# Patient Record
Sex: FEMALE | Race: WHITE | NOT HISPANIC OR LATINO | Employment: UNEMPLOYED | URBAN - NONMETROPOLITAN AREA
[De-identification: names, ages, dates, MRNs, and addresses within clinical notes are randomized per-mention and may not be internally consistent; named-entity substitution may affect disease eponyms.]

---

## 2017-01-17 ENCOUNTER — LAB REQUISITION (OUTPATIENT)
Dept: LAB | Facility: HOSPITAL | Age: 62
End: 2017-01-17

## 2017-01-17 DIAGNOSIS — O26.92 PREGNANCY RELATED CONDITION IN SECOND TRIMESTER: ICD-10-CM

## 2017-01-17 LAB
GLUCOSE BLD-MCNC: 249 MG/DL (ref 74–98)
HGB BLD-MCNC: 6.2 G/DL (ref 12–16)

## 2017-02-20 ENCOUNTER — OFFICE VISIT (OUTPATIENT)
Dept: INTERNAL MEDICINE | Facility: CLINIC | Age: 62
End: 2017-02-20

## 2017-02-20 VITALS
SYSTOLIC BLOOD PRESSURE: 110 MMHG | DIASTOLIC BLOOD PRESSURE: 70 MMHG | BODY MASS INDEX: 27 KG/M2 | HEIGHT: 66 IN | WEIGHT: 168 LBS | HEART RATE: 60 BPM

## 2017-02-20 DIAGNOSIS — K21.9 GASTROESOPHAGEAL REFLUX DISEASE WITHOUT ESOPHAGITIS: ICD-10-CM

## 2017-02-20 DIAGNOSIS — G47.00 INSOMNIA, UNSPECIFIED TYPE: ICD-10-CM

## 2017-02-20 DIAGNOSIS — E78.5 ELEVATED LIPIDS: ICD-10-CM

## 2017-02-20 DIAGNOSIS — I10 ESSENTIAL HYPERTENSION: Primary | ICD-10-CM

## 2017-02-20 DIAGNOSIS — D36.9 TUBULAR ADENOMA: ICD-10-CM

## 2017-02-20 DIAGNOSIS — D45 POLYCYTHEMIA VERA (HCC): ICD-10-CM

## 2017-02-20 DIAGNOSIS — K57.30 SIGMOID DIVERTICULOSIS: ICD-10-CM

## 2017-02-20 LAB
ALBUMIN SERPL-MCNC: 4 G/DL (ref 3.2–4.8)
ALBUMIN/GLOB SERPL: 1.4 G/DL (ref 1.5–2.5)
ALP SERPL-CCNC: 141 U/L (ref 25–100)
ALT SERPL W P-5'-P-CCNC: 21 U/L (ref 7–40)
ANION GAP SERPL CALCULATED.3IONS-SCNC: 2 MMOL/L (ref 3–11)
ARTICHOKE IGE QN: 99 MG/DL (ref 0–130)
AST SERPL-CCNC: 27 U/L (ref 0–33)
BILIRUB SERPL-MCNC: 0.7 MG/DL (ref 0.3–1.2)
BUN BLD-MCNC: 19 MG/DL (ref 9–23)
BUN/CREAT SERPL: 27.1 (ref 7–25)
CALCIUM SPEC-SCNC: 10.1 MG/DL (ref 8.7–10.4)
CHLORIDE SERPL-SCNC: 101 MMOL/L (ref 99–109)
CHOLEST SERPL-MCNC: 156 MG/DL (ref 0–200)
CO2 SERPL-SCNC: 37 MMOL/L (ref 20–31)
CREAT BLD-MCNC: 0.7 MG/DL (ref 0.6–1.3)
GFR SERPL CREATININE-BSD FRML MDRD: 85 ML/MIN/1.73
GLOBULIN UR ELPH-MCNC: 2.8 GM/DL
GLUCOSE BLD-MCNC: 101 MG/DL (ref 70–100)
HDLC SERPL-MCNC: 40 MG/DL (ref 40–60)
POTASSIUM BLD-SCNC: 5.1 MMOL/L (ref 3.5–5.5)
PROT SERPL-MCNC: 6.8 G/DL (ref 5.7–8.2)
SODIUM BLD-SCNC: 140 MMOL/L (ref 132–146)
TRIGL SERPL-MCNC: 133 MG/DL (ref 0–150)
TSH SERPL DL<=0.05 MIU/L-ACNC: 3.05 MIU/ML (ref 0.35–5.35)

## 2017-02-20 PROCEDURE — 80053 COMPREHEN METABOLIC PANEL: CPT | Performed by: INTERNAL MEDICINE

## 2017-02-20 PROCEDURE — 36415 COLL VENOUS BLD VENIPUNCTURE: CPT | Performed by: INTERNAL MEDICINE

## 2017-02-20 PROCEDURE — 84443 ASSAY THYROID STIM HORMONE: CPT | Performed by: INTERNAL MEDICINE

## 2017-02-20 PROCEDURE — 99214 OFFICE O/P EST MOD 30 MIN: CPT | Performed by: INTERNAL MEDICINE

## 2017-02-20 PROCEDURE — 80061 LIPID PANEL: CPT | Performed by: INTERNAL MEDICINE

## 2017-02-20 NOTE — PROGRESS NOTES
Patient is a 61 y.o. female who is here for a follow up of chronic conditions.  Chief Complaint   Patient presents with   • Hypertension   • Hyperlipidemia         HPI:  Here for f/u.  Doing well on just 1/2 of her BP med.  No dizziness or lightheadedness.  Sleep is not the best.  Energy level is fair.  No HA's.  No nausea or emesis.  No ankle edema.      History:    Patient Active Problem List   Diagnosis   • Malignant neoplasm of breast   • Elevated lipids   • Gastroesophageal reflux disease without esophagitis   • Hypertension   • Insomnia   • Polycythemia vera   • Tubular adenoma   • Sigmoid diverticulosis   • Thrombophlebitis arm       Past Medical History   Diagnosis Date   • Acute bronchitis    • Plantar fasciitis        Past Surgical History   Procedure Laterality Date   • Mastectomy     • Mastoidectomy Bilateral      Right in 12/02 and Left in 4/10   • Total abdominal hysterectomy  1995       Current Outpatient Prescriptions on File Prior to Visit   Medication Sig   • atorvastatin (LIPITOR) 10 MG tablet TAKE 1 TABLET BY MOUTH AT BEDTIME   • hydroxyurea (HYDREA) 500 MG capsule 3 capsules daily.   • valsartan-hydrochlorothiazide (DIOVAN-HCT) 160-25 MG per tablet TAKE 1 TABLET BY MOUTH EVERY MORNING     No current facility-administered medications on file prior to visit.        Family History   Problem Relation Age of Onset   • Heart disease Other    • Diabetes Other    • Hypertension Other    • Cancer Other    • Other Other      CVA (cerebral infarction)       Social History     Social History   • Marital status:      Spouse name: N/A   • Number of children: N/A   • Years of education: N/A     Occupational History   • Not on file.     Social History Main Topics   • Smoking status: Never Smoker   • Smokeless tobacco: Never Used   • Alcohol use Not on file   • Drug use: No   • Sexual activity: Not on file     Other Topics Concern   • Not on file     Social History Narrative         ROS:    Review of  "Systems   Constitutional: Negative for chills, diaphoresis, fatigue, fever and unexpected weight change.   HENT: Negative for congestion, ear pain, hearing loss, nosebleeds, postnasal drip, sinus pressure and sore throat.    Eyes: Negative for pain, discharge and itching.   Respiratory: Positive for cough. Negative for chest tightness, shortness of breath and wheezing.    Cardiovascular: Negative for chest pain, palpitations and leg swelling.   Gastrointestinal: Negative for abdominal distention, abdominal pain, blood in stool, constipation, diarrhea, nausea and vomiting.        6/16 colonoscopy with polypectomy times 6   Endocrine: Negative for polydipsia and polyuria.   Genitourinary: Negative for difficulty urinating, dysuria, frequency and hematuria.   Musculoskeletal: Negative for arthralgias, back pain, gait problem, joint swelling and myalgias.   Skin: Negative for rash and wound.   Neurological: Negative for dizziness, syncope, weakness and headaches.   Psychiatric/Behavioral: Positive for sleep disturbance. Negative for dysphoric mood. The patient is not nervous/anxious.        Visit Vitals   • /70   • Pulse 60   • Ht 66\" (167.6 cm)   • Wt 168 lb (76.2 kg)   • BMI 27.12 kg/m2       Physical Exam:    Physical Exam   Constitutional: She is oriented to person, place, and time. She appears well-developed and well-nourished.   HENT:   Head: Normocephalic and atraumatic.   Right Ear: External ear normal.   Left Ear: External ear normal.   Mouth/Throat: Oropharynx is clear and moist.   Eyes: Conjunctivae and EOM are normal.   Neck: Normal range of motion. Neck supple.   Cardiovascular: Normal rate, regular rhythm and normal heart sounds.    Pulmonary/Chest: Effort normal and breath sounds normal.   Abdominal: Soft. Bowel sounds are normal.   Musculoskeletal: Normal range of motion.   Lymphadenopathy:     She has no cervical adenopathy.   Neurological: She is alert and oriented to person, place, and time. "   Skin: Skin is warm and dry. No erythema.   Psychiatric: She has a normal mood and affect. Her behavior is normal. Thought content normal.       Procedure:      Discussion/Summary:  htn-stable on 1/2 dose  PV-per Dr Hook  hyperlipidemia-labs today  breast ca-per Dr Hook  TA-repeat colonoscopy summer 2019  insomnia-advised to take the trazodone prn  High risk meds-labs today      labs noted and dw patient, counseled on low carb       Current Outpatient Prescriptions:   •  atorvastatin (LIPITOR) 10 MG tablet, TAKE 1 TABLET BY MOUTH AT BEDTIME, Disp: 90 tablet, Rfl: 0  •  hydroxyurea (HYDREA) 500 MG capsule, 3 capsules daily., Disp: , Rfl: 3  •  valsartan-hydrochlorothiazide (DIOVAN-HCT) 160-25 MG per tablet, TAKE 1 TABLET BY MOUTH EVERY MORNING, Disp: 90 tablet, Rfl: 0        Kaylan was seen today for hypertension and hyperlipidemia.    Diagnoses and all orders for this visit:    Essential hypertension    Gastroesophageal reflux disease without esophagitis    Sigmoid diverticulosis    Polycythemia vera    Elevated lipids  -     Comprehensive Metabolic Panel  -     Lipid Panel  -     TSH    Insomnia, unspecified type    Tubular adenoma

## 2017-02-27 RX ORDER — ATORVASTATIN CALCIUM 10 MG/1
TABLET, FILM COATED ORAL
Qty: 90 TABLET | Refills: 0 | Status: SHIPPED | OUTPATIENT
Start: 2017-02-27 | End: 2017-05-31 | Stop reason: SDUPTHER

## 2017-02-27 RX ORDER — VALSARTAN AND HYDROCHLOROTHIAZIDE 160; 25 MG/1; MG/1
TABLET ORAL
Qty: 90 TABLET | Refills: 0 | Status: SHIPPED | OUTPATIENT
Start: 2017-02-27 | End: 2017-05-31 | Stop reason: SDUPTHER

## 2017-05-31 RX ORDER — ATORVASTATIN CALCIUM 10 MG
TABLET ORAL
Qty: 90 TABLET | Refills: 0 | Status: SHIPPED | OUTPATIENT
Start: 2017-05-31 | End: 2017-09-02 | Stop reason: SDUPTHER

## 2017-05-31 RX ORDER — VALSARTAN AND HYDROCHLOROTHIAZIDE 160; 25 MG/1; MG/1
TABLET ORAL
Qty: 90 TABLET | Refills: 0 | Status: SHIPPED | OUTPATIENT
Start: 2017-05-31 | End: 2017-09-02 | Stop reason: SDUPTHER

## 2017-08-24 ENCOUNTER — OFFICE VISIT (OUTPATIENT)
Dept: INTERNAL MEDICINE | Facility: CLINIC | Age: 62
End: 2017-08-24

## 2017-08-24 VITALS
WEIGHT: 166 LBS | SYSTOLIC BLOOD PRESSURE: 120 MMHG | HEIGHT: 66 IN | DIASTOLIC BLOOD PRESSURE: 70 MMHG | HEART RATE: 64 BPM | BODY MASS INDEX: 26.68 KG/M2

## 2017-08-24 DIAGNOSIS — K21.9 GASTROESOPHAGEAL REFLUX DISEASE WITHOUT ESOPHAGITIS: ICD-10-CM

## 2017-08-24 DIAGNOSIS — J30.2 SEASONAL ALLERGIC RHINITIS, UNSPECIFIED ALLERGIC RHINITIS TRIGGER: ICD-10-CM

## 2017-08-24 DIAGNOSIS — I10 ESSENTIAL HYPERTENSION: Primary | ICD-10-CM

## 2017-08-24 DIAGNOSIS — D45 POLYCYTHEMIA VERA (HCC): ICD-10-CM

## 2017-08-24 DIAGNOSIS — K57.30 SIGMOID DIVERTICULOSIS: ICD-10-CM

## 2017-08-24 DIAGNOSIS — G47.00 INSOMNIA, UNSPECIFIED TYPE: ICD-10-CM

## 2017-08-24 DIAGNOSIS — D36.9 TUBULAR ADENOMA: ICD-10-CM

## 2017-08-24 DIAGNOSIS — E78.5 ELEVATED LIPIDS: ICD-10-CM

## 2017-08-24 LAB
ALBUMIN SERPL-MCNC: 4.2 G/DL (ref 3.2–4.8)
ALBUMIN/GLOB SERPL: 1.5 G/DL (ref 1.5–2.5)
ALP SERPL-CCNC: 137 U/L (ref 25–100)
ALT SERPL W P-5'-P-CCNC: 20 U/L (ref 7–40)
ANION GAP SERPL CALCULATED.3IONS-SCNC: 8 MMOL/L (ref 3–11)
ARTICHOKE IGE QN: 90 MG/DL (ref 0–130)
AST SERPL-CCNC: 23 U/L (ref 0–33)
BILIRUB SERPL-MCNC: 0.6 MG/DL (ref 0.3–1.2)
BUN BLD-MCNC: 17 MG/DL (ref 9–23)
BUN/CREAT SERPL: 21.3 (ref 7–25)
CALCIUM SPEC-SCNC: 10.1 MG/DL (ref 8.7–10.4)
CHLORIDE SERPL-SCNC: 101 MMOL/L (ref 99–109)
CHOLEST SERPL-MCNC: 150 MG/DL (ref 0–200)
CO2 SERPL-SCNC: 30 MMOL/L (ref 20–31)
CREAT BLD-MCNC: 0.8 MG/DL (ref 0.6–1.3)
DEPRECATED RDW RBC AUTO: 83.5 FL (ref 37–54)
ERYTHROCYTE [DISTWIDTH] IN BLOOD BY AUTOMATED COUNT: 21 % (ref 11.3–14.5)
GFR SERPL CREATININE-BSD FRML MDRD: 72 ML/MIN/1.73
GLOBULIN UR ELPH-MCNC: 2.8 GM/DL
GLUCOSE BLD-MCNC: 95 MG/DL (ref 70–100)
HCT VFR BLD AUTO: 41.1 % (ref 34.5–44)
HDLC SERPL-MCNC: 35 MG/DL (ref 40–60)
HGB BLD-MCNC: 12.7 G/DL (ref 11.5–15.5)
MCH RBC QN AUTO: 34.5 PG (ref 27–31)
MCHC RBC AUTO-ENTMCNC: 30.9 G/DL (ref 32–36)
MCV RBC AUTO: 111.7 FL (ref 80–99)
PLATELET # BLD AUTO: 831 10*3/MM3 (ref 150–450)
PMV BLD AUTO: 12 FL (ref 6–12)
POTASSIUM BLD-SCNC: 4.6 MMOL/L (ref 3.5–5.5)
PROT SERPL-MCNC: 7 G/DL (ref 5.7–8.2)
RBC # BLD AUTO: 3.68 10*6/MM3 (ref 3.89–5.14)
SODIUM BLD-SCNC: 139 MMOL/L (ref 132–146)
TRIGL SERPL-MCNC: 172 MG/DL (ref 0–150)
WBC NRBC COR # BLD: 7.65 10*3/MM3 (ref 3.5–10.8)

## 2017-08-24 PROCEDURE — 80053 COMPREHEN METABOLIC PANEL: CPT | Performed by: INTERNAL MEDICINE

## 2017-08-24 PROCEDURE — 85027 COMPLETE CBC AUTOMATED: CPT | Performed by: INTERNAL MEDICINE

## 2017-08-24 PROCEDURE — 99214 OFFICE O/P EST MOD 30 MIN: CPT | Performed by: INTERNAL MEDICINE

## 2017-08-24 PROCEDURE — 80061 LIPID PANEL: CPT | Performed by: INTERNAL MEDICINE

## 2017-08-24 RX ORDER — LEVOCETIRIZINE DIHYDROCHLORIDE 5 MG/1
5 TABLET, FILM COATED ORAL EVERY EVENING
Qty: 30 TABLET | Refills: 5 | Status: SHIPPED | OUTPATIENT
Start: 2017-08-24

## 2017-08-24 NOTE — PROGRESS NOTES
Patient is a 62 y.o. female who is here for a follow up of chronic condition.  Chief Complaint   Patient presents with   • Hypertension   • Hyperlipidemia         HPI:  Here for f/u.  Some ear pressure.  Occasional allergies.  Still has nonproductive cough.  Continues on 1/2 of diovan.  Occasional dizziness.  No recurrent cyanosis.  Occasional HA's.  Sleep is not the best. No fever or chills or night sweats.   History:    Patient Active Problem List   Diagnosis   • Malignant neoplasm of breast   • Elevated lipids   • Gastroesophageal reflux disease without esophagitis   • Hypertension   • Insomnia   • Polycythemia vera   • Tubular adenoma   • Sigmoid diverticulosis   • Seasonal allergic rhinitis       Past Medical History:   Diagnosis Date   • Acute bronchitis    • Plantar fasciitis        Past Surgical History:   Procedure Laterality Date   • MASTECTOMY     • MASTOIDECTOMY Bilateral     Right in 12/02 and Left in 4/10   • TOTAL ABDOMINAL HYSTERECTOMY  1995       Current Outpatient Prescriptions on File Prior to Visit   Medication Sig   • hydroxyurea (HYDREA) 500 MG capsule 3 capsules daily.   • LIPITOR 10 MG tablet TAKE 1 TABLET BY MOUTH EVERY NIGHT AT BEDTIME   • valsartan-hydrochlorothiazide (DIOVAN-HCT) 160-25 MG per tablet TAKE 1 TABLET BY MOUTH EVERY MORNING     No current facility-administered medications on file prior to visit.        Family History   Problem Relation Age of Onset   • Heart disease Other    • Diabetes Other    • Hypertension Other    • Cancer Other    • Other Other      CVA (cerebral infarction)       Social History     Social History   • Marital status:      Spouse name: N/A   • Number of children: N/A   • Years of education: N/A     Occupational History   • Not on file.     Social History Main Topics   • Smoking status: Never Smoker   • Smokeless tobacco: Never Used   • Alcohol use Not on file   • Drug use: No   • Sexual activity: Not on file     Other Topics Concern   • Not on file  "    Social History Narrative         ROS:    Review of Systems   Constitutional: Negative for chills, diaphoresis, fatigue, fever and unexpected weight change.   HENT: Negative for congestion, ear pain, hearing loss, nosebleeds, postnasal drip, sinus pressure and sore throat.    Eyes: Negative for pain, discharge and itching.   Respiratory: Positive for cough. Negative for chest tightness, shortness of breath and wheezing.    Cardiovascular: Negative for chest pain, palpitations and leg swelling.   Gastrointestinal: Negative for abdominal distention, abdominal pain, blood in stool, constipation, diarrhea, nausea and vomiting.        6/16 colonoscopy with polypectomy times 6   Endocrine: Negative for polydipsia and polyuria.   Genitourinary: Negative for difficulty urinating, dysuria, frequency and hematuria.   Musculoskeletal: Negative for arthralgias, back pain, gait problem, joint swelling and myalgias.   Skin: Negative for rash and wound.   Allergic/Immunologic: Positive for environmental allergies.   Neurological: Negative for dizziness, syncope, weakness and headaches.   Psychiatric/Behavioral: Positive for sleep disturbance. Negative for dysphoric mood. The patient is not nervous/anxious.        /70  Pulse 64  Ht 66\" (167.6 cm)  Wt 166 lb (75.3 kg)  BMI 26.79 kg/m2    Physical Exam:    Physical Exam   Constitutional: She is oriented to person, place, and time. She appears well-developed and well-nourished.   HENT:   Head: Normocephalic and atraumatic.   Right Ear: External ear normal.   Left Ear: External ear normal.   Mouth/Throat: Oropharynx is clear and moist.   Dull tm   Eyes: Conjunctivae and EOM are normal.   Neck: Normal range of motion. Neck supple.   Cardiovascular: Normal rate, regular rhythm and normal heart sounds.    Pulmonary/Chest: Effort normal and breath sounds normal.   Abdominal: Soft. Bowel sounds are normal.   Musculoskeletal: Normal range of motion.   Lymphadenopathy:     She " has no cervical adenopathy.   Neurological: She is alert and oriented to person, place, and time.   Skin: Skin is warm and dry. No erythema.   Psychiatric: She has a normal mood and affect. Her behavior is normal. Thought content normal.       Procedure:      Discussion/Summary:  htn-stable on 1/2 dose  PV-per Dr Hook  hyperlipidemia-labs today  breast ca-per Dr Hook  TA-repeat colonoscopy summer 2019  insomnia-xyzal prn  Allergic rhinitis-xyzal rx  High risk meds-labs today      labs noted and dw patient, counseled on low carb       Current Outpatient Prescriptions:   •  hydroxyurea (HYDREA) 500 MG capsule, 3 capsules daily., Disp: , Rfl: 3  •  LIPITOR 10 MG tablet, TAKE 1 TABLET BY MOUTH EVERY NIGHT AT BEDTIME, Disp: 90 tablet, Rfl: 0  •  valsartan-hydrochlorothiazide (DIOVAN-HCT) 160-25 MG per tablet, TAKE 1 TABLET BY MOUTH EVERY MORNING, Disp: 90 tablet, Rfl: 0  •  levocetirizine (XYZAL) 5 MG tablet, Take 1 tablet by mouth Every Evening., Disp: 30 tablet, Rfl: 5        Kaylan was seen today for hypertension and hyperlipidemia.    Diagnoses and all orders for this visit:    Essential hypertension  -     CBC (No Diff)    Gastroesophageal reflux disease without esophagitis    Sigmoid diverticulosis    Polycythemia vera    Elevated lipids  -     Comprehensive Metabolic Panel  -     Lipid Panel    Insomnia, unspecified type    Tubular adenoma    Seasonal allergic rhinitis, unspecified allergic rhinitis trigger  -     levocetirizine (XYZAL) 5 MG tablet; Take 1 tablet by mouth Every Evening.

## 2017-09-05 RX ORDER — VALSARTAN AND HYDROCHLOROTHIAZIDE 160; 25 MG/1; MG/1
TABLET ORAL
Qty: 90 TABLET | Refills: 0 | Status: SHIPPED | OUTPATIENT
Start: 2017-09-05 | End: 2017-12-08 | Stop reason: SDUPTHER

## 2017-09-05 RX ORDER — ATORVASTATIN CALCIUM 10 MG
TABLET ORAL
Qty: 90 TABLET | Refills: 0 | Status: SHIPPED | OUTPATIENT
Start: 2017-09-05 | End: 2017-12-08 | Stop reason: SDUPTHER

## 2017-12-08 RX ORDER — ATORVASTATIN CALCIUM 10 MG/1
10 TABLET, FILM COATED ORAL NIGHTLY
Qty: 90 TABLET | Refills: 2 | Status: SHIPPED | OUTPATIENT
Start: 2017-12-08 | End: 2021-01-11 | Stop reason: SDUPTHER

## 2017-12-08 RX ORDER — VALSARTAN AND HYDROCHLOROTHIAZIDE 160; 25 MG/1; MG/1
1 TABLET ORAL DAILY
Qty: 90 TABLET | Refills: 2 | Status: SHIPPED | OUTPATIENT
Start: 2017-12-08 | End: 2021-01-11 | Stop reason: SDUPTHER

## 2018-02-23 ENCOUNTER — OFFICE VISIT (OUTPATIENT)
Dept: INTERNAL MEDICINE | Facility: CLINIC | Age: 63
End: 2018-02-23

## 2018-02-23 VITALS
SYSTOLIC BLOOD PRESSURE: 110 MMHG | HEIGHT: 66 IN | WEIGHT: 165 LBS | BODY MASS INDEX: 26.52 KG/M2 | DIASTOLIC BLOOD PRESSURE: 74 MMHG | HEART RATE: 60 BPM

## 2018-02-23 DIAGNOSIS — G47.00 INSOMNIA, UNSPECIFIED TYPE: ICD-10-CM

## 2018-02-23 DIAGNOSIS — E78.5 ELEVATED LIPIDS: ICD-10-CM

## 2018-02-23 DIAGNOSIS — D45 POLYCYTHEMIA VERA (HCC): ICD-10-CM

## 2018-02-23 DIAGNOSIS — K57.30 SIGMOID DIVERTICULOSIS: ICD-10-CM

## 2018-02-23 DIAGNOSIS — D36.9 TUBULAR ADENOMA: ICD-10-CM

## 2018-02-23 DIAGNOSIS — K21.9 GASTROESOPHAGEAL REFLUX DISEASE WITHOUT ESOPHAGITIS: ICD-10-CM

## 2018-02-23 DIAGNOSIS — I10 ESSENTIAL HYPERTENSION: Primary | ICD-10-CM

## 2018-02-23 DIAGNOSIS — J30.2 CHRONIC SEASONAL ALLERGIC RHINITIS DUE TO OTHER ALLERGEN: ICD-10-CM

## 2018-02-23 PROCEDURE — 99214 OFFICE O/P EST MOD 30 MIN: CPT | Performed by: INTERNAL MEDICINE

## 2018-02-23 NOTE — PROGRESS NOTES
Patient is a 62 y.o. female who is here for a follow up of chronic conditions.  Chief Complaint   Patient presents with   • Hypertension   • Hyperlipidemia         HPI:    Here for f/u.  Doing ok.  Energy level is good.  BP is good.  No abdominal pains.  No fever or chills.  Sleeping ok.  No dizziness or lightheadedness.  No edema.   History:    Patient Active Problem List   Diagnosis   • Malignant neoplasm of breast   • Elevated lipids   • Gastroesophageal reflux disease without esophagitis   • Hypertension   • Insomnia   • Polycythemia vera   • Tubular adenoma   • Sigmoid diverticulosis   • Seasonal allergic rhinitis       Past Medical History:   Diagnosis Date   • Acute bronchitis    • Plantar fasciitis        Past Surgical History:   Procedure Laterality Date   • MASTECTOMY     • MASTOIDECTOMY Bilateral     Right in 12/02 and Left in 4/10   • TOTAL ABDOMINAL HYSTERECTOMY  1995       Current Outpatient Prescriptions on File Prior to Visit   Medication Sig   • atorvastatin (LIPITOR) 10 MG tablet Take 1 tablet by mouth Every Night.   • hydroxyurea (HYDREA) 500 MG capsule 3 capsules daily.   • levocetirizine (XYZAL) 5 MG tablet Take 1 tablet by mouth Every Evening.   • valsartan-hydrochlorothiazide (DIOVAN-HCT) 160-25 MG per tablet Take 1 tablet by mouth Daily.     No current facility-administered medications on file prior to visit.        Family History   Problem Relation Age of Onset   • Heart disease Other    • Diabetes Other    • Hypertension Other    • Cancer Other    • Other Other      CVA (cerebral infarction)       Social History     Social History   • Marital status:      Spouse name: N/A   • Number of children: N/A   • Years of education: N/A     Occupational History   • Not on file.     Social History Main Topics   • Smoking status: Never Smoker   • Smokeless tobacco: Never Used   • Alcohol use Not on file   • Drug use: No   • Sexual activity: Not on file     Other Topics Concern   • Not on file  "    Social History Narrative         ROS:    Review of Systems   Constitutional: Negative for chills, diaphoresis, fatigue, fever and unexpected weight change.   HENT: Negative for congestion, ear pain, hearing loss, nosebleeds, postnasal drip, sinus pressure and sore throat.    Eyes: Negative for pain, discharge and itching.   Respiratory: Positive for cough. Negative for chest tightness, shortness of breath and wheezing.    Cardiovascular: Negative for chest pain, palpitations and leg swelling.   Gastrointestinal: Negative for abdominal distention, abdominal pain, blood in stool, constipation, diarrhea, nausea and vomiting.        6/16 colonoscopy with polypectomy times 6   Endocrine: Negative for polydipsia and polyuria.   Genitourinary: Negative for difficulty urinating, dysuria, frequency and hematuria.   Musculoskeletal: Negative for arthralgias, back pain, gait problem, joint swelling and myalgias.   Skin: Negative for rash and wound.   Allergic/Immunologic: Positive for environmental allergies.   Neurological: Negative for dizziness, syncope, weakness and headaches.   Psychiatric/Behavioral: Positive for sleep disturbance. Negative for dysphoric mood. The patient is not nervous/anxious.        /74  Pulse 60  Ht 167.6 cm (65.98\")  Wt 74.8 kg (165 lb)  BMI 26.64 kg/m2    Physical Exam:    Physical Exam   Constitutional: She is oriented to person, place, and time. She appears well-developed and well-nourished.   HENT:   Head: Normocephalic and atraumatic.   Right Ear: External ear normal.   Left Ear: External ear normal.   Mouth/Throat: Oropharynx is clear and moist.   Dull tm   Eyes: Conjunctivae and EOM are normal.   Neck: Normal range of motion. Neck supple.   Cardiovascular: Normal rate, regular rhythm and normal heart sounds.    Pulmonary/Chest: Effort normal and breath sounds normal.   Abdominal: Soft. Bowel sounds are normal.   Musculoskeletal: Normal range of motion.   Lymphadenopathy:     She " has no cervical adenopathy.   Neurological: She is alert and oriented to person, place, and time.   Skin: Skin is warm and dry. No erythema.   Psychiatric: She has a normal mood and affect. Her behavior is normal. Thought content normal.       Procedure:      Discussion/Summary:    htn-stable on 1/2 dose  PV-per Dr Hook  hyperlipidemia-labs soon  breast ca-per Dr Hook  TA-repeat colonoscopy summer 2019  Allergic rhinitis-xyzal rx prn  High risk meds-labs soon      labs noted and dw patient, counseled on low carb     Needs cmp/flp/tsh next week    Current Outpatient Prescriptions:   •  atorvastatin (LIPITOR) 10 MG tablet, Take 1 tablet by mouth Every Night., Disp: 90 tablet, Rfl: 2  •  hydroxyurea (HYDREA) 500 MG capsule, 3 capsules daily., Disp: , Rfl: 3  •  levocetirizine (XYZAL) 5 MG tablet, Take 1 tablet by mouth Every Evening., Disp: 30 tablet, Rfl: 5  •  valsartan-hydrochlorothiazide (DIOVAN-HCT) 160-25 MG per tablet, Take 1 tablet by mouth Daily., Disp: 90 tablet, Rfl: 2        Kaylan was seen today for hypertension and hyperlipidemia.    Diagnoses and all orders for this visit:    Essential hypertension    Chronic seasonal allergic rhinitis due to other allergen    Gastroesophageal reflux disease without esophagitis    Sigmoid diverticulosis    Polycythemia vera    Elevated lipids    Insomnia, unspecified type    Tubular adenoma

## 2018-08-23 ENCOUNTER — OFFICE VISIT (OUTPATIENT)
Dept: INTERNAL MEDICINE | Facility: CLINIC | Age: 63
End: 2018-08-23

## 2018-08-23 VITALS
HEIGHT: 66 IN | BODY MASS INDEX: 26.2 KG/M2 | WEIGHT: 163 LBS | SYSTOLIC BLOOD PRESSURE: 130 MMHG | HEART RATE: 64 BPM | DIASTOLIC BLOOD PRESSURE: 70 MMHG

## 2018-08-23 DIAGNOSIS — K57.30 SIGMOID DIVERTICULOSIS: ICD-10-CM

## 2018-08-23 DIAGNOSIS — E78.5 ELEVATED LIPIDS: ICD-10-CM

## 2018-08-23 DIAGNOSIS — F51.01 PRIMARY INSOMNIA: ICD-10-CM

## 2018-08-23 DIAGNOSIS — K21.9 GASTROESOPHAGEAL REFLUX DISEASE WITHOUT ESOPHAGITIS: ICD-10-CM

## 2018-08-23 DIAGNOSIS — J30.1 CHRONIC SEASONAL ALLERGIC RHINITIS DUE TO POLLEN: ICD-10-CM

## 2018-08-23 DIAGNOSIS — D36.9 TUBULAR ADENOMA: ICD-10-CM

## 2018-08-23 DIAGNOSIS — R73.09 ABNORMAL GLUCOSE: ICD-10-CM

## 2018-08-23 DIAGNOSIS — D45 POLYCYTHEMIA VERA (HCC): ICD-10-CM

## 2018-08-23 DIAGNOSIS — I10 ESSENTIAL HYPERTENSION: Primary | ICD-10-CM

## 2018-08-23 LAB
ALBUMIN SERPL-MCNC: 4.26 G/DL (ref 3.2–4.8)
ALBUMIN/GLOB SERPL: 1.5 G/DL (ref 1.5–2.5)
ALP SERPL-CCNC: 106 U/L (ref 25–100)
ALT SERPL W P-5'-P-CCNC: 15 U/L (ref 7–40)
ANION GAP SERPL CALCULATED.3IONS-SCNC: 11 MMOL/L (ref 3–11)
ARTICHOKE IGE QN: 97 MG/DL (ref 0–130)
AST SERPL-CCNC: 24 U/L (ref 0–33)
BILIRUB SERPL-MCNC: 0.7 MG/DL (ref 0.3–1.2)
BUN BLD-MCNC: 13 MG/DL (ref 9–23)
BUN/CREAT SERPL: 15.5 (ref 7–25)
CALCIUM SPEC-SCNC: 9.7 MG/DL (ref 8.7–10.4)
CHLORIDE SERPL-SCNC: 100 MMOL/L (ref 99–109)
CHOLEST SERPL-MCNC: 150 MG/DL (ref 0–200)
CO2 SERPL-SCNC: 30 MMOL/L (ref 20–31)
CREAT BLD-MCNC: 0.84 MG/DL (ref 0.6–1.3)
DEPRECATED RDW RBC AUTO: 72.1 FL (ref 37–54)
ERYTHROCYTE [DISTWIDTH] IN BLOOD BY AUTOMATED COUNT: 16.2 % (ref 11.3–14.5)
GFR SERPL CREATININE-BSD FRML MDRD: 68 ML/MIN/1.73
GLOBULIN UR ELPH-MCNC: 2.8 GM/DL
GLUCOSE BLD-MCNC: 101 MG/DL (ref 70–100)
HBA1C MFR BLD: 5.5 % (ref 4.8–5.6)
HCT VFR BLD AUTO: 42.7 % (ref 34.5–44)
HDLC SERPL-MCNC: 41 MG/DL (ref 40–60)
HGB BLD-MCNC: 13.4 G/DL (ref 11.5–15.5)
MCH RBC QN AUTO: 38 PG (ref 27–31)
MCHC RBC AUTO-ENTMCNC: 31.4 G/DL (ref 32–36)
MCV RBC AUTO: 121 FL (ref 80–99)
PLATELET # BLD AUTO: 865 10*3/MM3 (ref 150–450)
PMV BLD AUTO: 11.8 FL (ref 6–12)
POTASSIUM BLD-SCNC: 4.4 MMOL/L (ref 3.5–5.5)
PROT SERPL-MCNC: 7.1 G/DL (ref 5.7–8.2)
RBC # BLD AUTO: 3.53 10*6/MM3 (ref 3.89–5.14)
SODIUM BLD-SCNC: 141 MMOL/L (ref 132–146)
TRIGL SERPL-MCNC: 111 MG/DL (ref 0–150)
TSH SERPL DL<=0.05 MIU/L-ACNC: 2.31 MIU/ML (ref 0.35–5.35)
WBC NRBC COR # BLD: 5.73 10*3/MM3 (ref 3.5–10.8)

## 2018-08-23 PROCEDURE — 80061 LIPID PANEL: CPT | Performed by: INTERNAL MEDICINE

## 2018-08-23 PROCEDURE — 80053 COMPREHEN METABOLIC PANEL: CPT | Performed by: INTERNAL MEDICINE

## 2018-08-23 PROCEDURE — 85027 COMPLETE CBC AUTOMATED: CPT | Performed by: INTERNAL MEDICINE

## 2018-08-23 PROCEDURE — 83036 HEMOGLOBIN GLYCOSYLATED A1C: CPT | Performed by: INTERNAL MEDICINE

## 2018-08-23 PROCEDURE — 84443 ASSAY THYROID STIM HORMONE: CPT | Performed by: INTERNAL MEDICINE

## 2018-08-23 PROCEDURE — 99214 OFFICE O/P EST MOD 30 MIN: CPT | Performed by: INTERNAL MEDICINE

## 2018-08-23 NOTE — PROGRESS NOTES
Patient is a 63 y.o. female who is here for a follow up of chronic conditions.  Chief Complaint   Patient presents with   • Hypertension   • Hyperlipidemia         HPI:    Here for f/u.  Doing ok.  No abdominal pains.  No dizziness or lightheadedness.  Still has occasional cough.  No abdominal pains.  No edema. Sleeping well.   No cyanosis.   History:    Patient Active Problem List   Diagnosis   • Malignant neoplasm of breast (CMS/HCC)   • Elevated lipids   • Gastroesophageal reflux disease without esophagitis   • Hypertension   • Insomnia   • Polycythemia vera (CMS/HCC)   • Tubular adenoma   • Sigmoid diverticulosis   • Seasonal allergic rhinitis       Past Medical History:   Diagnosis Date   • Acute bronchitis    • Plantar fasciitis        Past Surgical History:   Procedure Laterality Date   • MASTECTOMY     • MASTOIDECTOMY Bilateral     Right in 12/02 and Left in 4/10   • TOTAL ABDOMINAL HYSTERECTOMY  1995       Current Outpatient Prescriptions on File Prior to Visit   Medication Sig   • atorvastatin (LIPITOR) 10 MG tablet Take 1 tablet by mouth Every Night.   • hydroxyurea (HYDREA) 500 MG capsule 3 capsules daily.   • levocetirizine (XYZAL) 5 MG tablet Take 1 tablet by mouth Every Evening.   • valsartan-hydrochlorothiazide (DIOVAN-HCT) 160-25 MG per tablet Take 1 tablet by mouth Daily.     No current facility-administered medications on file prior to visit.        Family History   Problem Relation Age of Onset   • Heart disease Other    • Diabetes Other    • Hypertension Other    • Cancer Other    • Other Other         CVA (cerebral infarction)       Social History     Social History   • Marital status:      Spouse name: N/A   • Number of children: N/A   • Years of education: N/A     Occupational History   • Not on file.     Social History Main Topics   • Smoking status: Never Smoker   • Smokeless tobacco: Never Used   • Alcohol use Not on file   • Drug use: No   • Sexual activity: Not on file     Other  "Topics Concern   • Not on file     Social History Narrative   • No narrative on file         Review of Systems   Constitutional: Negative for chills, diaphoresis, fatigue, fever and unexpected weight change.   HENT: Negative for congestion, ear pain, hearing loss, nosebleeds, postnasal drip, sinus pressure and sore throat.    Eyes: Negative for pain, discharge and itching.   Respiratory: Positive for cough. Negative for chest tightness, shortness of breath and wheezing.    Cardiovascular: Negative for chest pain, palpitations and leg swelling.   Gastrointestinal: Negative for abdominal distention, abdominal pain, blood in stool, constipation, diarrhea, nausea and vomiting.        6/16 colonoscopy with polypectomy times 6   Endocrine: Negative for polydipsia and polyuria.   Genitourinary: Negative for difficulty urinating, dysuria, frequency and hematuria.   Musculoskeletal: Negative for arthralgias, back pain, gait problem, joint swelling and myalgias.   Skin: Negative for rash and wound.   Allergic/Immunologic: Positive for environmental allergies.   Neurological: Negative for dizziness, syncope, weakness and headaches.   Psychiatric/Behavioral: Negative for dysphoric mood. The patient is not nervous/anxious.        /70   Pulse 64   Ht 167.6 cm (65.98\")   Wt 73.9 kg (163 lb)   BMI 26.32 kg/m²       Physical Exam   Constitutional: She is oriented to person, place, and time. She appears well-developed and well-nourished.   HENT:   Head: Normocephalic and atraumatic.   Right Ear: External ear normal.   Left Ear: External ear normal.   Mouth/Throat: Oropharynx is clear and moist.   Dull tm   Eyes: Conjunctivae and EOM are normal.   Neck: Normal range of motion. Neck supple.   Cardiovascular: Normal rate, regular rhythm and normal heart sounds.    Pulmonary/Chest: Effort normal and breath sounds normal.   Abdominal: Soft. Bowel sounds are normal.   Musculoskeletal: Normal range of motion.   Lymphadenopathy:    "  She has no cervical adenopathy.   Neurological: She is alert and oriented to person, place, and time.   Skin: Skin is warm and dry. No erythema.   Psychiatric: She has a normal mood and affect. Her behavior is normal. Thought content normal.       Procedure:      Discussion/Summary:    htn-stable on 1/2 dose  PV-per Dr Hook  hyperlipidemia-labs today  breast ca-per Dr Hook  TA-repeat colonoscopy summer 2019  Allergic rhinitis-xyzal rx prn  High risk meds-labs soon      labs noted and dw patient, counseled on low carb/ncs          Current Outpatient Prescriptions:   •  atorvastatin (LIPITOR) 10 MG tablet, Take 1 tablet by mouth Every Night., Disp: 90 tablet, Rfl: 2  •  hydroxyurea (HYDREA) 500 MG capsule, 3 capsules daily., Disp: , Rfl: 3  •  levocetirizine (XYZAL) 5 MG tablet, Take 1 tablet by mouth Every Evening., Disp: 30 tablet, Rfl: 5  •  valsartan-hydrochlorothiazide (DIOVAN-HCT) 160-25 MG per tablet, Take 1 tablet by mouth Daily., Disp: 90 tablet, Rfl: 2        Kaylan was seen today for hypertension and hyperlipidemia.    Diagnoses and all orders for this visit:    Essential hypertension  -     CBC (No Diff)    Chronic seasonal allergic rhinitis due to pollen    Gastroesophageal reflux disease without esophagitis    Sigmoid diverticulosis    Polycythemia vera (CMS/HCC)    Elevated lipids  -     Comprehensive Metabolic Panel  -     TSH  -     Lipid Panel    Primary insomnia    Tubular adenoma    Abnormal glucose  -     Hemoglobin A1c

## 2019-02-27 ENCOUNTER — OFFICE VISIT (OUTPATIENT)
Dept: INTERNAL MEDICINE | Facility: CLINIC | Age: 64
End: 2019-02-27

## 2019-02-27 VITALS
HEART RATE: 64 BPM | SYSTOLIC BLOOD PRESSURE: 120 MMHG | DIASTOLIC BLOOD PRESSURE: 70 MMHG | WEIGHT: 166 LBS | BODY MASS INDEX: 26.68 KG/M2 | HEIGHT: 66 IN

## 2019-02-27 DIAGNOSIS — K57.30 SIGMOID DIVERTICULOSIS: ICD-10-CM

## 2019-02-27 DIAGNOSIS — J30.1 SEASONAL ALLERGIC RHINITIS DUE TO POLLEN: ICD-10-CM

## 2019-02-27 DIAGNOSIS — E78.5 ELEVATED LIPIDS: ICD-10-CM

## 2019-02-27 DIAGNOSIS — F51.01 PRIMARY INSOMNIA: ICD-10-CM

## 2019-02-27 DIAGNOSIS — I10 ESSENTIAL HYPERTENSION: Primary | ICD-10-CM

## 2019-02-27 DIAGNOSIS — K21.9 GASTROESOPHAGEAL REFLUX DISEASE WITHOUT ESOPHAGITIS: ICD-10-CM

## 2019-02-27 DIAGNOSIS — D45 POLYCYTHEMIA VERA (HCC): ICD-10-CM

## 2019-02-27 LAB
ALBUMIN SERPL-MCNC: 4.37 G/DL (ref 3.2–4.8)
ALBUMIN/GLOB SERPL: 1.9 G/DL (ref 1.5–2.5)
ALP SERPL-CCNC: 122 U/L (ref 25–100)
ALT SERPL W P-5'-P-CCNC: 15 U/L (ref 7–40)
ANION GAP SERPL CALCULATED.3IONS-SCNC: 8 MMOL/L (ref 3–11)
ARTICHOKE IGE QN: 103 MG/DL (ref 0–130)
AST SERPL-CCNC: 20 U/L (ref 0–33)
BILIRUB SERPL-MCNC: 0.8 MG/DL (ref 0.3–1.2)
BUN BLD-MCNC: 21 MG/DL (ref 9–23)
BUN/CREAT SERPL: 23.6 (ref 7–25)
CALCIUM SPEC-SCNC: 9.6 MG/DL (ref 8.7–10.4)
CHLORIDE SERPL-SCNC: 100 MMOL/L (ref 99–109)
CHOLEST SERPL-MCNC: 170 MG/DL (ref 0–200)
CO2 SERPL-SCNC: 32 MMOL/L (ref 20–31)
CREAT BLD-MCNC: 0.89 MG/DL (ref 0.6–1.3)
GFR SERPL CREATININE-BSD FRML MDRD: 64 ML/MIN/1.73
GLOBULIN UR ELPH-MCNC: 2.3 GM/DL
GLUCOSE BLD-MCNC: 102 MG/DL (ref 70–100)
HDLC SERPL-MCNC: 36 MG/DL (ref 40–60)
POTASSIUM BLD-SCNC: 4.9 MMOL/L (ref 3.5–5.5)
PROT SERPL-MCNC: 6.7 G/DL (ref 5.7–8.2)
SODIUM BLD-SCNC: 140 MMOL/L (ref 132–146)
TRIGL SERPL-MCNC: 154 MG/DL (ref 0–150)
TSH SERPL DL<=0.05 MIU/L-ACNC: 2.65 MIU/ML (ref 0.35–5.35)

## 2019-02-27 PROCEDURE — 84443 ASSAY THYROID STIM HORMONE: CPT | Performed by: INTERNAL MEDICINE

## 2019-02-27 PROCEDURE — 80053 COMPREHEN METABOLIC PANEL: CPT | Performed by: INTERNAL MEDICINE

## 2019-02-27 PROCEDURE — 80061 LIPID PANEL: CPT | Performed by: INTERNAL MEDICINE

## 2019-02-27 PROCEDURE — 99214 OFFICE O/P EST MOD 30 MIN: CPT | Performed by: INTERNAL MEDICINE

## 2019-02-27 NOTE — PROGRESS NOTES
Patient is a 63 y.o. female who is here for a follow up of chronic conditions.  Chief Complaint   Patient presents with   • Hyperlipidemia   • Hypertension         HPI:    Here for f/u.  BP has been good.  No dizziness or lightheadedness.  No abdominal pains.  Energy level is good.  No falls. No edema.  No fever or chills or night sweats.      History:    Patient Active Problem List   Diagnosis   • Malignant neoplasm of breast (CMS/HCC)   • Elevated lipids   • Gastroesophageal reflux disease without esophagitis   • Hypertension   • Insomnia   • Polycythemia vera (CMS/HCC)   • Tubular adenoma   • Sigmoid diverticulosis   • Seasonal allergic rhinitis       Past Medical History:   Diagnosis Date   • Acute bronchitis    • Plantar fasciitis        Past Surgical History:   Procedure Laterality Date   • MASTECTOMY     • MASTOIDECTOMY Bilateral     Right in 12/02 and Left in 4/10   • TOTAL ABDOMINAL HYSTERECTOMY  1995       Current Outpatient Medications on File Prior to Visit   Medication Sig   • atorvastatin (LIPITOR) 10 MG tablet Take 1 tablet by mouth Every Night.   • hydroxyurea (HYDREA) 500 MG capsule 3 capsules daily.   • levocetirizine (XYZAL) 5 MG tablet Take 1 tablet by mouth Every Evening.   • valsartan-hydrochlorothiazide (DIOVAN-HCT) 160-25 MG per tablet Take 1 tablet by mouth Daily.     No current facility-administered medications on file prior to visit.        Family History   Problem Relation Age of Onset   • Heart disease Other    • Diabetes Other    • Hypertension Other    • Cancer Other    • Other Other         CVA (cerebral infarction)       Social History     Socioeconomic History   • Marital status:      Spouse name: Not on file   • Number of children: Not on file   • Years of education: Not on file   • Highest education level: Not on file   Social Needs   • Financial resource strain: Not on file   • Food insecurity - worry: Not on file   • Food insecurity - inability: Not on file   •  "Transportation needs - medical: Not on file   • Transportation needs - non-medical: Not on file   Occupational History   • Not on file   Tobacco Use   • Smoking status: Never Smoker   • Smokeless tobacco: Never Used   Substance and Sexual Activity   • Alcohol use: Not on file   • Drug use: No   • Sexual activity: Not on file   Other Topics Concern   • Not on file   Social History Narrative   • Not on file         Review of Systems   Constitutional: Negative for chills, diaphoresis, fatigue, fever and unexpected weight change.   HENT: Negative for congestion, ear pain, hearing loss, nosebleeds, postnasal drip, sinus pressure and sore throat.    Eyes: Negative for pain, discharge and itching.   Respiratory: Positive for cough. Negative for chest tightness, shortness of breath and wheezing.    Cardiovascular: Negative for chest pain, palpitations and leg swelling.   Gastrointestinal: Negative for abdominal distention, abdominal pain, blood in stool, constipation, diarrhea, nausea and vomiting.        6/16 colonoscopy with polypectomy times 6   Endocrine: Negative for polydipsia and polyuria.   Genitourinary: Negative for difficulty urinating, dysuria, frequency and hematuria.   Musculoskeletal: Negative for arthralgias, back pain, gait problem, joint swelling and myalgias.   Skin: Negative for rash and wound.   Allergic/Immunologic: Positive for environmental allergies.   Neurological: Negative for dizziness, syncope, weakness and headaches.   Psychiatric/Behavioral: Negative for dysphoric mood. The patient is not nervous/anxious.        /70   Pulse 64   Ht 167.6 cm (65.98\")   Wt 75.3 kg (166 lb)   BMI 26.81 kg/m²       Physical Exam   Constitutional: She is oriented to person, place, and time. She appears well-developed and well-nourished.   HENT:   Head: Normocephalic and atraumatic.   Right Ear: External ear normal.   Left Ear: External ear normal.   Mouth/Throat: Oropharynx is clear and moist.   Dull tm "   Eyes: Conjunctivae and EOM are normal.   Neck: Normal range of motion. Neck supple.   Cardiovascular: Normal rate, regular rhythm and normal heart sounds.   Pulmonary/Chest: Effort normal and breath sounds normal.   Abdominal: Soft. Bowel sounds are normal.   Musculoskeletal: Normal range of motion.   Lymphadenopathy:     She has no cervical adenopathy.   Neurological: She is alert and oriented to person, place, and time.   Skin: Skin is warm and dry. No erythema.   Psychiatric: She has a normal mood and affect. Her behavior is normal. Thought content normal.       Procedure:      Discussion/Summary:    htn-stable on 1/2 dose  PV-per Dr Hook  hyperlipidemia-labs today  breast ca-per Dr Hook  TA-repeat colonoscopy summer 2019  Allergic rhinitis-xyzal rx prn  High risk meds-labs today      labs noted and dw patient, counseled on low carb/ncs        Current Outpatient Medications:   •  atorvastatin (LIPITOR) 10 MG tablet, Take 1 tablet by mouth Every Night., Disp: 90 tablet, Rfl: 2  •  hydroxyurea (HYDREA) 500 MG capsule, 3 capsules daily., Disp: , Rfl: 3  •  levocetirizine (XYZAL) 5 MG tablet, Take 1 tablet by mouth Every Evening., Disp: 30 tablet, Rfl: 5  •  valsartan-hydrochlorothiazide (DIOVAN-HCT) 160-25 MG per tablet, Take 1 tablet by mouth Daily., Disp: 90 tablet, Rfl: 2        Kaylan was seen today for hyperlipidemia and hypertension.    Diagnoses and all orders for this visit:    Essential hypertension    Seasonal allergic rhinitis due to pollen    Sigmoid diverticulosis    Gastroesophageal reflux disease without esophagitis    Polycythemia vera (CMS/HCC)    Primary insomnia    Elevated lipids  -     Comprehensive Metabolic Panel  -     Lipid Panel  -     TSH

## 2019-08-27 ENCOUNTER — OFFICE VISIT (OUTPATIENT)
Dept: INTERNAL MEDICINE | Facility: CLINIC | Age: 64
End: 2019-08-27

## 2019-08-27 VITALS
BODY MASS INDEX: 26.03 KG/M2 | WEIGHT: 162 LBS | SYSTOLIC BLOOD PRESSURE: 120 MMHG | HEIGHT: 66 IN | HEART RATE: 68 BPM | DIASTOLIC BLOOD PRESSURE: 68 MMHG

## 2019-08-27 DIAGNOSIS — J30.1 SEASONAL ALLERGIC RHINITIS DUE TO POLLEN: ICD-10-CM

## 2019-08-27 DIAGNOSIS — E78.5 ELEVATED LIPIDS: ICD-10-CM

## 2019-08-27 DIAGNOSIS — I10 ESSENTIAL HYPERTENSION: Primary | ICD-10-CM

## 2019-08-27 DIAGNOSIS — K57.30 SIGMOID DIVERTICULOSIS: ICD-10-CM

## 2019-08-27 DIAGNOSIS — D36.9 TUBULAR ADENOMA: ICD-10-CM

## 2019-08-27 DIAGNOSIS — K21.9 GASTROESOPHAGEAL REFLUX DISEASE WITHOUT ESOPHAGITIS: ICD-10-CM

## 2019-08-27 DIAGNOSIS — F51.01 PRIMARY INSOMNIA: ICD-10-CM

## 2019-08-27 DIAGNOSIS — D45 POLYCYTHEMIA VERA (HCC): ICD-10-CM

## 2019-08-27 DIAGNOSIS — E55.9 VITAMIN D DEFICIENCY: ICD-10-CM

## 2019-08-27 LAB
25(OH)D3 SERPL-MCNC: 12.1 NG/ML (ref 30–100)
ALBUMIN SERPL-MCNC: 4.4 G/DL (ref 3.5–5.2)
ALBUMIN/GLOB SERPL: 1.4 G/DL
ALP SERPL-CCNC: 115 U/L (ref 39–117)
ALT SERPL W P-5'-P-CCNC: 11 U/L (ref 1–33)
ANION GAP SERPL CALCULATED.3IONS-SCNC: 11.6 MMOL/L (ref 5–15)
AST SERPL-CCNC: 22 U/L (ref 1–32)
BILIRUB SERPL-MCNC: 0.6 MG/DL (ref 0.2–1.2)
BUN BLD-MCNC: 14 MG/DL (ref 8–23)
BUN/CREAT SERPL: 17.7 (ref 7–25)
CALCIUM SPEC-SCNC: 9.6 MG/DL (ref 8.6–10.5)
CHLORIDE SERPL-SCNC: 99 MMOL/L (ref 98–107)
CHOLEST SERPL-MCNC: 146 MG/DL (ref 0–200)
CO2 SERPL-SCNC: 31.4 MMOL/L (ref 22–29)
CREAT BLD-MCNC: 0.79 MG/DL (ref 0.57–1)
DEPRECATED RDW RBC AUTO: 84.4 FL (ref 37–54)
ERYTHROCYTE [DISTWIDTH] IN BLOOD BY AUTOMATED COUNT: 19.9 % (ref 12.3–15.4)
GFR SERPL CREATININE-BSD FRML MDRD: 73 ML/MIN/1.73
GLOBULIN UR ELPH-MCNC: 3.1 GM/DL
GLUCOSE BLD-MCNC: 96 MG/DL (ref 65–99)
HCT VFR BLD AUTO: 40.4 % (ref 34–46.6)
HDLC SERPL-MCNC: 35 MG/DL (ref 40–60)
HGB BLD-MCNC: 12.3 G/DL (ref 12–15.9)
LDLC SERPL CALC-MCNC: 70 MG/DL (ref 0–100)
LDLC/HDLC SERPL: 1.99 {RATIO}
MCH RBC QN AUTO: 35.8 PG (ref 26.6–33)
MCHC RBC AUTO-ENTMCNC: 30.4 G/DL (ref 31.5–35.7)
MCV RBC AUTO: 117.4 FL (ref 79–97)
PLATELET # BLD AUTO: 700 10*3/MM3 (ref 140–450)
PMV BLD AUTO: 12 FL (ref 6–12)
POTASSIUM BLD-SCNC: 3.9 MMOL/L (ref 3.5–5.2)
PROT SERPL-MCNC: 7.5 G/DL (ref 6–8.5)
RBC # BLD AUTO: 3.44 10*6/MM3 (ref 3.77–5.28)
SODIUM BLD-SCNC: 142 MMOL/L (ref 136–145)
TRIGL SERPL-MCNC: 206 MG/DL (ref 0–150)
TSH SERPL DL<=0.05 MIU/L-ACNC: 3.59 MIU/ML (ref 0.27–4.2)
VIT B12 BLD-MCNC: 398 PG/ML (ref 211–946)
VLDLC SERPL-MCNC: 41.2 MG/DL (ref 5–40)
WBC NRBC COR # BLD: 7.32 10*3/MM3 (ref 3.4–10.8)

## 2019-08-27 PROCEDURE — 82607 VITAMIN B-12: CPT | Performed by: INTERNAL MEDICINE

## 2019-08-27 PROCEDURE — 82306 VITAMIN D 25 HYDROXY: CPT | Performed by: INTERNAL MEDICINE

## 2019-08-27 PROCEDURE — 99214 OFFICE O/P EST MOD 30 MIN: CPT | Performed by: INTERNAL MEDICINE

## 2019-08-27 PROCEDURE — 80061 LIPID PANEL: CPT | Performed by: INTERNAL MEDICINE

## 2019-08-27 PROCEDURE — 84443 ASSAY THYROID STIM HORMONE: CPT | Performed by: INTERNAL MEDICINE

## 2019-08-27 PROCEDURE — 80053 COMPREHEN METABOLIC PANEL: CPT | Performed by: INTERNAL MEDICINE

## 2019-08-27 PROCEDURE — 85027 COMPLETE CBC AUTOMATED: CPT | Performed by: INTERNAL MEDICINE

## 2019-08-27 NOTE — PROGRESS NOTES
Patient is a 64 y.o. female who is here for a follow up of chronic conditions.  Chief Complaint   Patient presents with   • Hypertension         HPI:    Here for f/u.  Doing ok.  BP has been good. Rare HAs.  No dizziness or lightheadedness.  No fever or chills or night sweats. No abdominal pains.  No edema.  No SOB.  No diarrhea or constipation.     History:     Patient Active Problem List   Diagnosis   • Malignant neoplasm of breast (CMS/HCC)   • Elevated lipids   • Gastroesophageal reflux disease without esophagitis   • Hypertension   • Insomnia   • Polycythemia vera (CMS/HCC)   • Tubular adenoma   • Sigmoid diverticulosis   • Seasonal allergic rhinitis       Past Medical History:   Diagnosis Date   • Acute bronchitis    • Plantar fasciitis        Past Surgical History:   Procedure Laterality Date   • MASTECTOMY     • MASTOIDECTOMY Bilateral     Right in 12/02 and Left in 4/10   • TOTAL ABDOMINAL HYSTERECTOMY  1995       Current Outpatient Medications on File Prior to Visit   Medication Sig   • atorvastatin (LIPITOR) 10 MG tablet Take 1 tablet by mouth Every Night.   • hydroxyurea (HYDREA) 500 MG capsule 3 capsules daily.   • levocetirizine (XYZAL) 5 MG tablet Take 1 tablet by mouth Every Evening.   • valsartan-hydrochlorothiazide (DIOVAN-HCT) 160-25 MG per tablet Take 1 tablet by mouth Daily.     No current facility-administered medications on file prior to visit.        Family History   Problem Relation Age of Onset   • Heart disease Other    • Diabetes Other    • Hypertension Other    • Cancer Other    • Other Other         CVA (cerebral infarction)       Social History     Socioeconomic History   • Marital status:      Spouse name: Not on file   • Number of children: Not on file   • Years of education: Not on file   • Highest education level: Not on file   Tobacco Use   • Smoking status: Never Smoker   • Smokeless tobacco: Never Used   Substance and Sexual Activity   • Drug use: No         Review of  "Systems   Constitutional: Negative for chills, diaphoresis, fatigue, fever and unexpected weight change.   HENT: Negative for congestion, ear pain, hearing loss, nosebleeds, postnasal drip, sinus pressure and sore throat.    Eyes: Negative for pain, discharge and itching.   Respiratory: Positive for cough. Negative for chest tightness, shortness of breath and wheezing.    Cardiovascular: Negative for chest pain, palpitations and leg swelling.   Gastrointestinal: Negative for abdominal distention, abdominal pain, blood in stool, constipation, diarrhea, nausea and vomiting.        6/16 colonoscopy with polypectomy times 6   Endocrine: Negative for polydipsia and polyuria.   Genitourinary: Negative for difficulty urinating, dysuria, frequency and hematuria.   Musculoskeletal: Negative for arthralgias, back pain, gait problem, joint swelling and myalgias.   Skin: Negative for rash and wound.   Allergic/Immunologic: Positive for environmental allergies.   Neurological: Negative for dizziness, syncope, weakness and headaches.   Psychiatric/Behavioral: Negative for dysphoric mood. The patient is not nervous/anxious.        /68 (BP Location: Left arm, Patient Position: Sitting)   Pulse 68   Ht 167.6 cm (65.98\")   Wt 73.5 kg (162 lb)   BMI 26.16 kg/m²       Physical Exam   Constitutional: She is oriented to person, place, and time. She appears well-developed and well-nourished.   HENT:   Head: Normocephalic and atraumatic.   Right Ear: External ear normal.   Left Ear: External ear normal.   Mouth/Throat: Oropharynx is clear and moist.   Dull tm   Eyes: Conjunctivae and EOM are normal.   Neck: Normal range of motion. Neck supple.   Cardiovascular: Normal rate, regular rhythm and normal heart sounds.   Pulmonary/Chest: Effort normal and breath sounds normal.   Abdominal: Soft. Bowel sounds are normal.   Musculoskeletal: Normal range of motion.   Lymphadenopathy:     She has no cervical adenopathy.   Neurological: She " is alert and oriented to person, place, and time.   Skin: Skin is warm and dry. No erythema.   Psychiatric: She has a normal mood and affect. Her behavior is normal. Thought content normal.       Procedure:      Discussion/Summary:    htn-stable on 1/2 dose  PV-per Dr Hook, stable  hyperlipidemia-labs today, counseled on diet  breast ca-per Dr Hook  TA-repeat colonoscopy summer 2019, will arrange  Allergic rhinitis-xyzal rx prn, stable  High risk meds-labs today      labs noted and dw patient, counseled on Vit D 2000 IU qd    Current Outpatient Medications:   •  atorvastatin (LIPITOR) 10 MG tablet, Take 1 tablet by mouth Every Night., Disp: 90 tablet, Rfl: 2  •  hydroxyurea (HYDREA) 500 MG capsule, 3 capsules daily., Disp: , Rfl: 3  •  levocetirizine (XYZAL) 5 MG tablet, Take 1 tablet by mouth Every Evening., Disp: 30 tablet, Rfl: 5  •  valsartan-hydrochlorothiazide (DIOVAN-HCT) 160-25 MG per tablet, Take 1 tablet by mouth Daily., Disp: 90 tablet, Rfl: 2        Kaylan was seen today for hypertension.    Diagnoses and all orders for this visit:    Essential hypertension  -     CBC (No Diff)    Seasonal allergic rhinitis due to pollen    Sigmoid diverticulosis    Gastroesophageal reflux disease without esophagitis    Polycythemia vera (CMS/HCC)  -     Vitamin B12    Tubular adenoma    Primary insomnia    Elevated lipids  -     Comprehensive Metabolic Panel  -     Lipid Panel  -     TSH    Vitamin D deficiency  -     Vitamin D 25 Hydroxy

## 2020-03-16 ENCOUNTER — TELEPHONE (OUTPATIENT)
Dept: INTERNAL MEDICINE | Facility: CLINIC | Age: 65
End: 2020-03-16

## 2020-03-16 NOTE — TELEPHONE ENCOUNTER
----- Message from Afsaneh Ambrocio MA sent at 3/16/2020  1:22 PM EDT -----  Regarding: FW: WELCOME TO MEDICARE  Contact: [phone]  Can you let her know that I think they are cancelling wellness exams through may 18 I believe    ----- Message -----  From: Frederick Kaur, All Rep  Sent: 3/16/2020  10:11 AM EDT  To: Afsaneh Ambrocio MA  Subject: FW: WELCOME TO MEDICARE                              ----- Message -----  From: Ruth Lehman [employer] Rep  Sent: 3/13/2020   3:45 PM EDT  To: Letitia Vallecillo [facility]  Subject: WELCOME TO MEDICARE                              Patient had to cancel her Welcome to Medicare on Monday and she would like to reschedule, but there is nothing until November.  Can she get in sooner?    Can reach her at this number also [phone]

## 2021-01-11 RX ORDER — ATORVASTATIN CALCIUM 10 MG/1
10 TABLET, FILM COATED ORAL NIGHTLY
Qty: 90 TABLET | Refills: 3 | Status: SHIPPED | OUTPATIENT
Start: 2021-01-11 | End: 2022-01-04

## 2021-01-11 RX ORDER — VALSARTAN AND HYDROCHLOROTHIAZIDE 160; 25 MG/1; MG/1
1 TABLET ORAL DAILY
Qty: 90 TABLET | Refills: 3 | Status: SHIPPED | OUTPATIENT
Start: 2021-01-11 | End: 2022-01-04

## 2022-01-04 RX ORDER — ATORVASTATIN CALCIUM 10 MG/1
10 TABLET, FILM COATED ORAL NIGHTLY
Qty: 90 TABLET | Refills: 3 | Status: SHIPPED | OUTPATIENT
Start: 2022-01-04 | End: 2022-12-27

## 2022-01-04 RX ORDER — VALSARTAN AND HYDROCHLOROTHIAZIDE 160; 25 MG/1; MG/1
1 TABLET ORAL DAILY
Qty: 90 TABLET | Refills: 3 | Status: SHIPPED | OUTPATIENT
Start: 2022-01-04 | End: 2022-12-27

## 2022-12-27 RX ORDER — VALSARTAN AND HYDROCHLOROTHIAZIDE 160; 25 MG/1; MG/1
1 TABLET ORAL DAILY
Qty: 90 TABLET | Refills: 3 | Status: SHIPPED | OUTPATIENT
Start: 2022-12-27

## 2022-12-27 RX ORDER — ATORVASTATIN CALCIUM 10 MG/1
10 TABLET, FILM COATED ORAL NIGHTLY
Qty: 90 TABLET | Refills: 3 | Status: SHIPPED | OUTPATIENT
Start: 2022-12-27